# Patient Record
(demographics unavailable — no encounter records)

---

## 2024-11-18 NOTE — ASSESSMENT
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 12/7/22 CT c/a/p showed  Unchanged right upper lobe 4 mm nodule. No evidence of recurrent or metastatic disease in the chest abdomen or pelvis.  Plan  will be followed in my clinic every 12 months with H & P and CT c/a/p up to Jan 2023, clinically indicated for whole blood 5HIAA and chromogranin A if she has carcinoid syndrome such as flushing, abdominal pain, diarrhea even with heart murmur  will have surveillance scans as clinically indicated  Follow up with [facility] for her breast cancer treatment  RTC in 12 months.

## 2024-11-18 NOTE — RESULTS/DATA
[FreeTextEntry1] : 6/4/2015\par  CBC 4.6>16.0<268\par  \par  12/10/2015\par  CBC 5.2>15.3<272\par  chromogranin A 30\par  Bun/Cr wnl\par  LFT wnl\par  PROCEDURE  DATE:    Dec    7  2015\par  \par  \par  INTERPRETATION:    CLINICAL  INFORMATION:  Malignant  neuroendocrine  neoplasm  of\par  the  appendix  status  post  right  hemicolectomy.  Follow-up.\par  \par  COMPARISON:  CT  March 19, 2014\par  \par  PROCEDURE:\par  CT  of  the  Chest,  Abdomen  and  Pelvis  was  performed  with  intravenous  contrast.\par  Imaging  of  the  upper  abdomen  was  performed  in  the  arterial  and  portal  venous\par  phases.\par  Intravenous  contrast:  90  ml  Omnipaque  350.  10  ml  discarded.\par  Oral  contrast:None.\par  Sagittal  and  coronal  reformats  were  performed.\par  \par  FINDINGS:\par  \par  CHEST:\par  \par  LUNGS  AND  LARGE  AIRWAYS:  Patent  central  airways.No  pulmonary  nodules.\par  PLEURA:  No  pleural  effusion.\par  VESSELS:  Within  normal  limits.\par  HEART:  Heart  size  is  normal.No  pericardial  effusion.\par  MEDIASTINUM  AND  ERIBERTO:  No  lymphadenopathy.\par  CHEST  WALL  AND  LOWER  NECK:  Within  normal  limits.\par  \par  ABDOMEN  AND  PELVIS:\par  \par  LIVER:  A  few  scattered  subcentimeter  hypodense  hepatic  foci,  too  small  to\par  characterize.\par  BILE  DUCTS:  Normal  caliber.\par  GALLBLADDER:  Status  post  cholecystectomy.\par  SPLEEN:  Within  normal  limits.\par  PANCREAS:  Within  normal  limits.\par  ADRENALS:  Within  normal  limits.\par  KIDNEYS/URETERS:  Two  less  than  1  cm  hypodense  right  renal  lesions,  too  small\par  to  characterize.\par  \par  BLADDER:  Within  normal  limits.\par  REPRODUCTIVE  ORGANS:  The  uterus  and  adnexa  are  within  normal  limits.\par  \par  BOWEL:  Status  post  right  hemicolectomy.  No  bowel  obstruction.  No  mesenteric\par  adenopathy  or  evidence  of  recurrent  disease.\par  PERITONEUM:  No  ascites.\par  VESSELS:    Within  normal  limits.\par  RETROPERITONEUM:  No  lymphadenopathy.\par  ABDOMINAL  WALL:  Within  normal  limits.\par  BONES:  Degenerative  changes  of  the  spine.\par  \par  IMPRESSION:  No  evidence  of  recurrent  or  metastatic  disease.\par  \par  \par  \par

## 2024-11-18 NOTE — REVIEW OF SYSTEMS
[Abdominal Pain] : no abdominal pain [Vomiting] : no vomiting [Constipation] : no constipation [Muscle Pain] : no muscle pain [Muscle Weakness] : no muscle weakness [Hot Flashes] : no hot flashes [FreeTextEntry9] : hand and knee, shoulder pain

## 2024-11-18 NOTE — HISTORY OF PRESENT ILLNESS
[de-identified] : Nuvia Valadez is a 61 years old female who initially presented with acute RLQ pain in Nov 2012. Colonoscopy showed the lesion in the cecum and appendix and biopsy revealed intramucosal collection of neuroendocrine glands.   She underwent hemicolectomy on Nov 20, 2012. Surgical pathology showed well differentiated neuroendocrine carcinoma of appendix, 13.5cm, grade 1, mitotic index <1/10 HPF, ki67 index<2%, 9/16 lymph nodes involvement,  staging pT3N1. Postoperative MRI abdomen on Dec 27, 2012 revealed a 1.7x1.2cm prominent node in the central mesentery. On Jan 28, 2013 Octreotide scan showed abnormal somatostatin receptor bearing tumor in the anterior midline of the abdomen likely the central mesenteric lymph node on MRI. She underwent laparoscopic resection of the lesion in the mesentery. The pathology confirmed the same type of tumor. [facility] 2014, recent on May 12, 2015 all without evidence of disease. Colonoscopy Nov 2013 was normal, EGD July 2014 was normal.   [de-identified] : In April 2016 Mammogram breast showed a small nodule in left breast. She underwent biopsy which showed triple negative breast cancer. She is going for lumpectomy, chemotherapy and radiation. Triple negative left side breast cancer s/p lumpectomy, adjuvant CMF total 8 cycles completed in September 2016 and radiation total 4 weeks.   Interval scans CT c/a/p Dec 2017  showed no evidence of recurrence. In September 1, 2017 colonoscopy showed normal result.   11/3/2020 She feels epigastric gasy pain and bloating in last two week, denies chest pain, hot flashes, nausea/vomiting/diarrhea/constipation.  11/5/21 she reports knee pain, shoulder pain, finger joint pain, otherwise, feels fine. Denies abdominal pain, hot flash, nausea, vomiting, diarrhea.   11/18/22 Reports right knee pain, shoulder pain and finger pain. Otherwise, she feels fine.   12/7/22 CT c/a/p showed Unchanged right upper lobe 4 mm nodule. No evidence of recurrent or metastatic disease in the chest abdomen or pelvis.  11/18/24 feels overall fine, denies weight loss and abdominal pain and melena.